# Patient Record
Sex: MALE | Race: WHITE | Employment: FULL TIME | URBAN - NONMETROPOLITAN AREA
[De-identification: names, ages, dates, MRNs, and addresses within clinical notes are randomized per-mention and may not be internally consistent; named-entity substitution may affect disease eponyms.]

---

## 2023-12-13 ENCOUNTER — HOSPITAL ENCOUNTER (EMERGENCY)
Age: 68
Discharge: HOME | End: 2023-12-13
Payer: MEDICARE

## 2023-12-13 VITALS
TEMPERATURE: 100.3 F | OXYGEN SATURATION: 96 % | HEIGHT: 68 IN | BODY MASS INDEX: 30.31 KG/M2 | DIASTOLIC BLOOD PRESSURE: 72 MMHG | SYSTOLIC BLOOD PRESSURE: 120 MMHG | HEART RATE: 91 BPM | RESPIRATION RATE: 18 BRPM | WEIGHT: 200 LBS

## 2023-12-13 DIAGNOSIS — U07.1 COVID-19: Primary | ICD-10-CM

## 2023-12-13 LAB — SARS-COV-2 RDRP RESP QL NAA+PROBE: DETECTED

## 2023-12-13 PROCEDURE — 87635 SARS-COV-2 COVID-19 AMP PRB: CPT

## 2023-12-13 PROCEDURE — 99203 OFFICE O/P NEW LOW 30 MIN: CPT

## 2023-12-13 PROCEDURE — 99213 OFFICE O/P EST LOW 20 MIN: CPT

## 2023-12-13 RX ORDER — PANTOPRAZOLE SODIUM 20 MG/1
20 TABLET, DELAYED RELEASE ORAL DAILY
COMMUNITY

## 2023-12-13 ASSESSMENT — PAIN - FUNCTIONAL ASSESSMENT: PAIN_FUNCTIONAL_ASSESSMENT: 0-10

## 2023-12-13 ASSESSMENT — PAIN DESCRIPTION - DESCRIPTORS: DESCRIPTORS: DISCOMFORT

## 2023-12-13 ASSESSMENT — PAIN SCALES - GENERAL: PAINLEVEL_OUTOF10: 4

## 2023-12-13 ASSESSMENT — PAIN DESCRIPTION - LOCATION: LOCATION: HEAD

## 2023-12-13 ASSESSMENT — PAIN DESCRIPTION - ORIENTATION: ORIENTATION: ANTERIOR

## 2023-12-13 NOTE — ED NOTES
Pt with complaints of a cough, nasal congestion and headache that started 3 days ago. States he feels as if he has a sinus infection. States cough is productive but he has not looked at mucus.      Domi Izaguirre, TASHA  [phone]

## 2023-12-13 NOTE — DISCHARGE INSTRUCTIONS
COVID positive. Per CDC guidelines must isolate for 5 days from the start of symptoms, after isolation wear a mask in public for an additional 5 days. Encourage to use over-the-counter Zyrtec, Flonase, and Mucinex D. Can use other over-the-counter cough suppressant. Should have good hand hygiene and cover mouth when coughing. Use over-the-counter Tylenol and Motrin for pain or fever. Should follow-up with PCP in 3 to 5 days and worsening symptoms.